# Patient Record
Sex: MALE | Race: WHITE
[De-identification: names, ages, dates, MRNs, and addresses within clinical notes are randomized per-mention and may not be internally consistent; named-entity substitution may affect disease eponyms.]

---

## 2018-04-23 ENCOUNTER — HOSPITAL ENCOUNTER (EMERGENCY)
Dept: HOSPITAL 25 - ED | Age: 23
Discharge: ANOTHER HOSPITAL | End: 2018-04-23
Payer: COMMERCIAL

## 2018-04-23 VITALS — DIASTOLIC BLOOD PRESSURE: 72 MMHG | SYSTOLIC BLOOD PRESSURE: 136 MMHG
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
## 2018-04-23 DIAGNOSIS — S82.52XA: ICD-10-CM

## 2018-04-23 DIAGNOSIS — F16.121: Primary | ICD-10-CM

## 2018-04-23 DIAGNOSIS — S32.592A: ICD-10-CM

## 2018-04-23 DIAGNOSIS — S92.102A: ICD-10-CM

## 2018-04-23 LAB
BASOPHILS # BLD AUTO: 0 10^3/UL (ref 0–0.2)
EOSINOPHIL # BLD AUTO: 0.1 10^3/UL (ref 0–0.6)
HCT VFR BLD AUTO: 43 % (ref 42–52)
HGB BLD-MCNC: 14.4 G/DL (ref 14–18)
LYMPHOCYTES # BLD AUTO: 1.2 10^3/UL (ref 1–4.8)
MCH RBC QN AUTO: 29 PG (ref 27–31)
MCHC RBC AUTO-ENTMCNC: 34 G/DL (ref 31–36)
MCV RBC AUTO: 87 FL (ref 80–94)
MONOCYTES # BLD AUTO: 1.2 10^3/UL (ref 0–0.8)
NEUTROPHILS # BLD AUTO: 15.8 10^3/UL (ref 1.5–7.7)
NRBC # BLD AUTO: 0 10^3/UL
NRBC BLD QL AUTO: 0
PLATELET # BLD AUTO: 232 10^3/UL (ref 150–450)
RBC # BLD AUTO: 4.9 10^6/UL (ref 4–5.4)
WBC # BLD AUTO: 18.3 10^3/UL (ref 3.5–10.8)

## 2018-04-23 PROCEDURE — 96374 THER/PROPH/DIAG INJ IV PUSH: CPT

## 2018-04-23 PROCEDURE — 29515 APPLICATION SHORT LEG SPLINT: CPT

## 2018-04-23 PROCEDURE — 36415 COLL VENOUS BLD VENIPUNCTURE: CPT

## 2018-04-23 PROCEDURE — 81003 URINALYSIS AUTO W/O SCOPE: CPT

## 2018-04-23 PROCEDURE — 81015 MICROSCOPIC EXAM OF URINE: CPT

## 2018-04-23 PROCEDURE — 72170 X-RAY EXAM OF PELVIS: CPT

## 2018-04-23 PROCEDURE — 99285 EMERGENCY DEPT VISIT HI MDM: CPT

## 2018-04-23 PROCEDURE — 96375 TX/PRO/DX INJ NEW DRUG ADDON: CPT

## 2018-04-23 PROCEDURE — 82550 ASSAY OF CK (CPK): CPT

## 2018-04-23 PROCEDURE — 96360 HYDRATION IV INFUSION INIT: CPT

## 2018-04-23 PROCEDURE — 70450 CT HEAD/BRAIN W/O DYE: CPT

## 2018-04-23 PROCEDURE — 80307 DRUG TEST PRSMV CHEM ANLYZR: CPT

## 2018-04-23 PROCEDURE — 85025 COMPLETE CBC W/AUTO DIFF WBC: CPT

## 2018-04-23 PROCEDURE — 87086 URINE CULTURE/COLONY COUNT: CPT

## 2018-04-23 PROCEDURE — 80053 COMPREHEN METABOLIC PANEL: CPT

## 2018-04-23 PROCEDURE — 71045 X-RAY EXAM CHEST 1 VIEW: CPT

## 2018-04-23 NOTE — RAD
HISTORY: Trauma, ankle injury. No other history is provided.



COMPARISONS: None



VIEWS: 1: frontal portable view of the chest at 2:25 AM



FINDINGS:

LINES AND TUBES: None.

CARDIOMEDIASTINAL SILHOUETTE: The cardiomediastinal silhouette is normal for portable

technique.

PLEURA: The costophrenic angles are sharp. No pleural abnormalities are noted.

LUNG PARENCHYMA: The lungs are clear.

ABDOMEN: The upper abdomen is clear. There is no subphrenic gas.

BONES AND SOFT TISSUES: No bone or soft tissue abnormalities are noted.



IMPRESSION: NO ACTIVE CARDIOPULMONARY DISEASE.

## 2018-04-23 NOTE — RAD
Indication: LEFT ankle pain; illicit drug use. Possible injury.



Comparison: No relevant prior exams available on the [facility] PACS for comparison.



Technique: AP, mortise, and lateral views LEFT ankle.



REPORT AND 

IMPRESSION:  Avulsion fracture at the medial malleolus, possibly comminuted, with up to

0.8 cm anterior and inferior displacement. The ankle mortise remains congruent. Soft

tissue swelling most prominent over the medial malleolus.

## 2018-04-23 NOTE — RAD
INDICATION:  Left hip pain



TECHNIQUE: An AP view of the pelvis was obtained.



FINDINGS: There is a minimally displaced fracture involving the lateral superior pubic

ramus with discontinuity at the ischial pectineal line. The remaining visualized bones are

otherwise intact and appropriately aligned.



IMPRESSION:  Minimally displaced fracture at the lateral superior pubic ramus.

## 2018-04-23 NOTE — ED
Forrest PEREZ Nikita, scribed for Corky Recinos MD on 04/23/18 at 0041 .





Lower Extremity





- HPI Summary


HPI Summary: 


This patient is a 22 year old M presenting to ED with a chief complaint of R 

ankle pain s/p fall from unknown height and after taking acid at 1620. The 

patient has been on drugs for most of his teenage life.  The patient reports 

that he wants to stop taking acid. The patient rates the pain 7/10 in severity. 

Symptoms aggravated by nothing. Symptoms alleviated by nothing. Patient reports 

feeling paranoid and L hip pain. Hx is limited due to intoxication.





- History of Current Complaint


Chief Complaint: EDExtremityLower


Stated Complaint: ANKLE INJURY


Time Seen by Provider: 04/23/18 00:04


Hx Obtained From: Patient - limited due to intoxication


Mechanism Of Injury: Fall From Height Of: - unknown


Severity Initially: Moderate


Severity Currently: Moderate


Pain Intensity: 7


Pain Scale Used: 0-10 Numeric


Timing: Constant, Lasting Hours


Location: Is Discrete @ - R ankle and L hip


Associated Signs And Symptoms: Positive: Other - took acid at 1620, feeling 

paranoid


Aggravating Factor(s): Nothing


Alleviating Factor(s): Nothing





- Allergies/Home Medications


Allergies/Adverse Reactions: 


 Allergies











Allergy/AdvReac Type Severity Reaction Status Date / Time


 


No Known Allergies Allergy   Verified 08/07/15 08:25














PMH/Surg Hx/FS Hx/Imm Hx


Endocrine/Hematology History: 


   Denies: Hx Diabetes


Cardiovascular History: 


   Denies: Hx Coronary Artery Disease, Hx Pacemaker/ICD


Respiratory History: Reports: Hx Asthma


Sensory History: 


   Denies: Hx Hearing Aid


Psychiatric History: Reports: Hx of Violent Episodes Against Others


   Denies: Hx Panic Disorder





- Surgical History


Surgery Procedure, Year, and Place: Reports had to have one testicle dropped 

when he was a child


Infectious Disease History: No


Infectious Disease History: 


   Denies: Traveled Outside the [place] in Last 30 Days





- Family History


Known Family History: Positive: Other


Family History: Hx limited due to intoxication





- Social History


Alcohol Use: Occasionally


Alcohol Amount: Smells of ETOH at present


Substance Use Type: Reports: None


Smoking Status (MU): Never Smoked Tobacco





Review of Systems


Positive: Other - took acid at 1620


Positive: Other - R ankle pain and L hip pain s/p fall from unknown height


Psychological: Other - paranoia


All Other Systems Reviewed And Are Negative: Yes





Physical Exam





- Summary


Physical Exam Summary: 


Appearance: Well appearing, no pain distress, dissociated -- staring off and 

slow to respond.


Skin: warm, dry, reflects adequate perfusion


Head/face: no septal hematoma, dried blood on the bridge of the nose, contusion 

to upper lip, teeth are normal, nl occlusion


Eyes: EOMI, no nystagmus, pupils sluggish


ENT: as above with nasal injury, no facial instability


Neck: supple, non-tender. Not transported in collar. Collar applied.


Respiratory: CTA, breath sounds present


Cardiovascular: RRR, pulses symmetrical 


Abdomen: non-tender, soft


Bowel Sounds: present


Musculoskeletal: swelling of medial aspect of L ankle and lateral aspect of R 

ankle. Pain with flexion L hip. Pelvis stable.


Neuro: Alert and oriented x 2. Slow speech with reported paranoia. CN intact. 


PSYCH: appears dissociated and slowed/flat affect.


Hx limited by intoxication


Triage Information Reviewed: Yes


Vital Signs On Initial Exam: 


 Initial Vitals











Temp Pulse Resp BP Pulse Ox


 


 98.7 F   82   16   143/74   99 


 


 04/23/18 00:02  04/23/18 00:02  04/23/18 00:02  04/23/18 00:02  04/23/18 00:02











Vital Signs Reviewed: Yes





Procedures





- Procedure Summary


Procedure Summary: 





Splint L ankle/foot: for fracture.


Desc: A posterior splint of OCL was applied by me. NV intact. Tolerated well.





- Splinting


Location: R ankle and foot


Hand-Made Type: orthoglass


Splint: a posterior and a stirrup


Pre-Proc Neuro Vasc Exam: normal


Post-Proc Neuro Vasc Exam: normal





Diagnostics





- Vital Signs


 Vital Signs











  Temp Pulse Resp BP Pulse Ox


 


 04/23/18 00:05   83   143/74  100


 


 04/23/18 00:04   83    99


 


 04/23/18 00:02  98.7 F  82  16  143/74  99














- Laboratory


Lab Results: 


 Lab Results











  04/23/18 04/23/18 04/23/18 Range/Units





  00:54 00:54 01:04 


 


WBC   18.3 H   (3.5-10.8)  10^3/ul


 


RBC   4.90   (4.0-5.4)  10^6/ul


 


Hgb   14.4   (14.0-18.0)  g/dl


 


Hct   43   (42-52)  %


 


MCV   87   (80-94)  fL


 


MCH   29   (27-31)  pg


 


MCHC   34   (31-36)  g/dl


 


RDW   13   (10.5-15)  %


 


Plt Count   232   (150-450)  10^3/ul


 


MPV   7.9   (7.4-10.4)  um3


 


Neut % (Auto)   86.6 H   (38-83)  %


 


Lymph % (Auto)   6.4 L   (25-47)  %


 


Mono % (Auto)   6.5   (0-7)  %


 


Eos % (Auto)   0.4   (0-6)  %


 


Baso % (Auto)   0.1   (0-2)  %


 


Absolute Neuts (auto)   15.8 H   (1.5-7.7)  10^3/ul


 


Absolute Lymphs (auto)   1.2   (1.0-4.8)  10^3/ul


 


Absolute Monos (auto)   1.2 H   (0-0.8)  10^3/ul


 


Absolute Eos (auto)   0.1   (0-0.6)  10^3/ul


 


Absolute Basos (auto)   0   (0-0.2)  10^3/ul


 


Absolute Nucleated RBC   0   10^3/ul


 


Nucleated RBC %   0   


 


Sodium  137 L    (139-145)  mmol/L


 


Potassium  3.3 L    (3.5-5.0)  mmol/L


 


Chloride  101    (101-111)  mmol/L


 


Carbon Dioxide  23    (22-32)  mmol/L


 


Anion Gap  13 H    (2-11)  mmol/L


 


BUN  13    (6-24)  mg/dL


 


Creatinine  1.31 H    (0.67-1.17)  mg/dL


 


Est GFR ( Amer)  87.2    (>60)  


 


Est GFR (Non-Af Amer)  67.8    (>60)  


 


BUN/Creatinine Ratio  9.9    (8-20)  


 


Glucose  96    ()  mg/dL


 


Calcium  9.8    (8.6-10.3)  mg/dL


 


Total Bilirubin  0.70    (0.2-1.0)  mg/dL


 


AST  46 H    (13-39)  U/L


 


ALT  26    (7-52)  U/L


 


Alkaline Phosphatase  47    ()  U/L


 


Total Creatine Kinase  603 H    ()  U/L


 


Total Protein  7.4    (6.4-8.9)  g/dL


 


Albumin  4.7    (3.2-5.2)  g/dL


 


Globulin  2.7    (2-4)  g/dL


 


Albumin/Globulin Ratio  1.7    (1-3)  


 


Urine Color     


 


Urine Appearance     


 


Urine pH     (5-9)  


 


Ur Specific Gravity     (1.010-1.030)  


 


Urine Protein     (Negative)  


 


Urine Ketones     (Negative)  


 


Urine Blood     (Negative)  


 


Urine Nitrate     (Negative)  


 


Urine Bilirubin     (Negative)  


 


Urine Urobilinogen     (Negative)  


 


Ur Leukocyte Esterase     (Negative)  


 


Urine WBC (Auto)     (Absent)  


 


Urine RBC (Auto)     (Absent)  


 


Urine Bacteria     (Absent)  


 


Urine Glucose     (Negative)  


 


Urine Opiates Screen    None detected  (None Detect)  


 


Ur Barbiturates Screen    None detected  (None Detect)  


 


Ur Phencyclidine Scrn    None detected  (None Detect)  


 


Ur Amphetamines Screen    None detected  (None Detect)  


 


U Benzodiazepines Scrn    None detected  (None Detect)  


 


Urine Cocaine Screen    None detected  (None Detect)  


 


U Cannabinoids Screen    None detected  (None Detect)  














  04/23/18 Range/Units





  01:04 


 


WBC   (3.5-10.8)  10^3/ul


 


RBC   (4.0-5.4)  10^6/ul


 


Hgb   (14.0-18.0)  g/dl


 


Hct   (42-52)  %


 


MCV   (80-94)  fL


 


MCH   (27-31)  pg


 


MCHC   (31-36)  g/dl


 


RDW   (10.5-15)  %


 


Plt Count   (150-450)  10^3/ul


 


MPV   (7.4-10.4)  um3


 


Neut % (Auto)   (38-83)  %


 


Lymph % (Auto)   (25-47)  %


 


Mono % (Auto)   (0-7)  %


 


Eos % (Auto)   (0-6)  %


 


Baso % (Auto)   (0-2)  %


 


Absolute Neuts (auto)   (1.5-7.7)  10^3/ul


 


Absolute Lymphs (auto)   (1.0-4.8)  10^3/ul


 


Absolute Monos (auto)   (0-0.8)  10^3/ul


 


Absolute Eos (auto)   (0-0.6)  10^3/ul


 


Absolute Basos (auto)   (0-0.2)  10^3/ul


 


Absolute Nucleated RBC   10^3/ul


 


Nucleated RBC %   


 


Sodium   (139-145)  mmol/L


 


Potassium   (3.5-5.0)  mmol/L


 


Chloride   (101-111)  mmol/L


 


Carbon Dioxide   (22-32)  mmol/L


 


Anion Gap   (2-11)  mmol/L


 


BUN   (6-24)  mg/dL


 


Creatinine   (0.67-1.17)  mg/dL


 


Est GFR ( Amer)   (>60)  


 


Est GFR (Non-Af Amer)   (>60)  


 


BUN/Creatinine Ratio   (8-20)  


 


Glucose   ()  mg/dL


 


Calcium   (8.6-10.3)  mg/dL


 


Total Bilirubin   (0.2-1.0)  mg/dL


 


AST   (13-39)  U/L


 


ALT   (7-52)  U/L


 


Alkaline Phosphatase   ()  U/L


 


Total Creatine Kinase   ()  U/L


 


Total Protein   (6.4-8.9)  g/dL


 


Albumin   (3.2-5.2)  g/dL


 


Globulin   (2-4)  g/dL


 


Albumin/Globulin Ratio   (1-3)  


 


Urine Color  Straw  


 


Urine Appearance  Clear  


 


Urine pH  6.0  (5-9)  


 


Ur Specific Gravity  1.003 L  (1.010-1.030)  


 


Urine Protein  Negative  (Negative)  


 


Urine Ketones  1+ A  (Negative)  


 


Urine Blood  3+ A  (Negative)  


 


Urine Nitrate  Negative  (Negative)  


 


Urine Bilirubin  Negative  (Negative)  


 


Urine Urobilinogen  Negative  (Negative)  


 


Ur Leukocyte Esterase  Negative  (Negative)  


 


Urine WBC (Auto)  Trace(0-5/hpf)  (Absent)  


 


Urine RBC (Auto)  1+(3-5/hpf) A  (Absent)  


 


Urine Bacteria  1+ A  (Absent)  


 


Urine Glucose  Negative  (Negative)  


 


Urine Opiates Screen   (None Detect)  


 


Ur Barbiturates Screen   (None Detect)  


 


Ur Phencyclidine Scrn   (None Detect)  


 


Ur Amphetamines Screen   (None Detect)  


 


U Benzodiazepines Scrn   (None Detect)  


 


Urine Cocaine Screen   (None Detect)  


 


U Cannabinoids Screen   (None Detect)  











Result Diagrams: 


 04/23/18 00:54





 04/23/18 00:54


Lab Statement: Any lab studies that have been ordered have been reviewed, and 

results considered in the medical decision making process.





- Radiology


  ** L ankle XR


Radiology Interpretation Completed By: ED Physician - medial malleolus avulsion 

fracture





  ** R ankle XR


Radiology Interpretation Completed By: ED Physician - talus fracture





  ** R foot XR


Radiology Interpretation Completed By: ED Physician - calcaneal fracture and 

talus fracture





  ** L foot XR


Radiology Interpretation Completed By: ED Physician - medial malleolar avulsion 

fracture





  ** pelvic XR


Radiology Interpretation Completed By: ED Physician - L-sided superior and 

inferior pubic rami fracture





  ** CXR


Radiology Interpretation Completed By: ED Physician - No acute dz





- CT


  ** Brain


CT Interpretation Completed By: Radiologist - Pending official interpretation 

from radiologist. See [facility].





Re-Evaluation





- Re-Evaluation


  ** First Eval


Re-Evaluation Time: 02:10


Change: Unchanged


Comment: The patient was put in a splint.





  ** Second Eval


Change: Unchanged - Pt's friend available by phone. States he fell off a roof.





Lower Extremity Course/Dx





- Course


Course Of Treatment: Pt presents with evidence of trauma with unknown mechanism 

after using LSD. Hx is extremely limited due to this. Pt wants to stand and 

walk despite significant deformity to both feet/ankle. He has no complaint -- 

but on exam its found he has tenderness in both feet/ankle and L hip with 

flexion. His abd is non-tender. Screening xray pelvis shows pelvic fxs. Both 

ankles are fx as well. CT of brain performed and appeared neg. When other 

injuries found full trauma scans ordered and transfer to trauma center 

arranged. Trauma scans will not be available prior to transfer but 4 view FAST 

scan is NEG. Urine is clear/yellow. His Cr is elevated at 1.31. Films placed on 

disk. Helicopter transport given his drug use/altered mental status, poly 

trauma. Will require full trauma eval on arrival. Helicopter team arrived 

quickly for transport.





- Diagnoses


Differential Diagnosis/HQI/PQRI: Positive: Other - LSD abuse, talus fracture, 

calcaneal fracture, medial malleolus avulsion fracture, L-sided superior and 

inferior pubic rami fracture


Provider Diagnoses: 


 Lysergic acid diethylamide (LSD) abuse, Talus fracture, Calcaneal fracture, 

Avulsion fracture of medial malleolus, Fracture of left inferior pubic ramus, 

Fracture of left superior pubic ramus, Delirium due to dissociative drug








- Physician Notifications


Discussed Care Of Patient With: Eddie Nguyen MD


Time Discussed With Above Provider: 01:55


Instructed by Provider To: Other - Consulted Dr. Nguyen at [facility] who 

accepts the patient for admission.





- Critical Care Time


Critical Care Time: 30-74 min - CCT is exclusive of separately billable 

procedures





Discharge





- Sign-Out/Discharge


Documenting (check all that apply): Discharge - transfer to [facility]





- Discharge Plan


Condition: Guarded


Disposition: TRANS HIGHER LVL OF CARE FAC


Referrals: 


Non Staff,Doctor [Primary Care Provider] - 





- Billing Disposition and Condition


Condition: GUARDED


Disposition: EMTALA





The documentation as recorded by the Forrest vail Nikita accurately reflects the 

service I personally performed and the decisions made by me, Corky Recinos MD.

## 2018-04-23 NOTE — RAD
HISTORY: Head injury, intoxication



COMPARISONS: September 26, 2013



TECHNIQUE: Multiple contiguous axial CT scans were obtained of the head without 

intravenous contrast. 



FINDINGS: 

HEMORRHAGE/INFARCT: There is no hemorrhage or acute infarct.

MASSES/SHIFT: There is no mass or shift.



EXTRA-AXIAL SPACES: There are no extra-axial fluid collections.

SULCI AND VENTRICLES: The sulci and ventricles are normal in size and position for the

patient's stated age. Incidentally noted is a cavum septum pellucidum et vergae.



CEREBRUM: There are no focal parenchymal abnormalities.

BRAINSTEM: There are no focal parenchymal abnormalities.

CEREBELLUM: There are no focal parenchymal abnormalities.



VESSELS: The vessels are grossly normal.

PARANASAL SINUSES: The paranasal sinuses are clear.

ORBITS: The orbits are unremarkable.

BONES AND SOFT TISSUE: No bone or soft tissue abnormalities are noted.



OTHER: None



IMPRESSION: 

NO ACUTE INTRACRANIAL PATHOLOGY.

## 2018-04-23 NOTE — RAD
HISTORY: Right foot and ankle pain, injury



COMPARISONS: None



VIEWS: 5, Frontal, lateral, and oblique views of the right foot and ankle



FINDINGS:



BONE DENSITY: Normal.

BONES: There is comminuted and displaced fracture of the calcaneus with extension to the

subtalar joint    

JOINTS: There is no arthropathy.    

ALIGNMENT: There is no dislocation. 

SOFT TISSUES: Unremarkable.



OTHER FINDINGS: None.



IMPRESSION: 

COMMINUTED AND DISPLACED FRACTURE OF THE CALCANEUS WITH EXTENSION TO THE SUBTALAR JOINT.

## 2018-04-23 NOTE — RAD
Indication: LEFT foot pain following injury.



Comparison: LEFT ankle of the same date. 



Technique: AP and lateral views LEFT foot



REPORT AND IMPRESSION:  Avulsion fracture at the medial malleolus. No additional fracture

evident. Normal articular alignment. Soft tissue swelling about the ankle and hindfoot.